# Patient Record
Sex: MALE | Race: WHITE | NOT HISPANIC OR LATINO | Employment: FULL TIME | URBAN - METROPOLITAN AREA
[De-identification: names, ages, dates, MRNs, and addresses within clinical notes are randomized per-mention and may not be internally consistent; named-entity substitution may affect disease eponyms.]

---

## 2019-08-05 ENCOUNTER — HOSPITAL ENCOUNTER (EMERGENCY)
Facility: CLINIC | Age: 24
Discharge: HOME | End: 2019-08-05
Attending: NURSE PRACTITIONER | Admitting: NURSE PRACTITIONER
Payer: OTHER MISCELLANEOUS

## 2019-08-05 VITALS
OXYGEN SATURATION: 100 % | BODY MASS INDEX: 21.82 KG/M2 | DIASTOLIC BLOOD PRESSURE: 83 MMHG | WEIGHT: 170 LBS | HEIGHT: 74 IN | SYSTOLIC BLOOD PRESSURE: 125 MMHG | TEMPERATURE: 97.7 F | RESPIRATION RATE: 16 BRPM

## 2019-08-05 DIAGNOSIS — S50.811A ABRASION OF RIGHT FOREARM, INITIAL ENCOUNTER: ICD-10-CM

## 2019-08-05 PROCEDURE — 99282 EMERGENCY DEPT VISIT SF MDM: CPT | Performed by: NURSE PRACTITIONER

## 2019-08-05 PROCEDURE — 99282 EMERGENCY DEPT VISIT SF MDM: CPT | Mod: Z6 | Performed by: NURSE PRACTITIONER

## 2019-08-05 RX ORDER — LIDOCAINE HYDROCHLORIDE 20 MG/ML
JELLY TOPICAL ONCE
Status: DISCONTINUED | OUTPATIENT
Start: 2019-08-05 | End: 2019-08-05 | Stop reason: HOSPADM

## 2019-08-05 RX ORDER — GINSENG 100 MG
CAPSULE ORAL 2 TIMES DAILY
Status: DISCONTINUED | OUTPATIENT
Start: 2019-08-05 | End: 2019-08-05 | Stop reason: HOSPADM

## 2019-08-05 ASSESSMENT — MIFFLIN-ST. JEOR: SCORE: 1825.86

## 2019-08-05 ASSESSMENT — ENCOUNTER SYMPTOMS: FEVER: 0

## 2019-08-05 NOTE — ED AVS SNAPSHOT
[facility]  [address] [facility] [address]  Phone:  [phone]  Fax:  [phone]                                    Simone Gallardo   MRN: [id number]    Department:  [facility]   Date of Visit:  8/5/2019           After Visit Summary Signature Page    I have received my discharge instructions, and my questions have been answered. I have discussed any challenges I see with this plan with the nurse or doctor.    ..........................................................................................................................................  Patient/Patient Representative Signature      ..........................................................................................................................................  Patient Representative Print Name and Relationship to Patient    ..................................................               ................................................  Date                                   Time    ..........................................................................................................................................  Reviewed by Signature/Title    ...................................................              ..............................................  Date                                               Time          22EPIC Rev 08/18

## 2019-08-05 NOTE — ED TRIAGE NOTES
to right lower forearm, states he was a work and a machine fell. aprox 4 inch lac, bleeding controlled with bandage

## 2019-08-05 NOTE — DISCHARGE INSTRUCTIONS
Bacitracin and cover with nonadherent dressing. Change minimally twice a day and as needed.  Keep wound clean, dry and covered until healed.  Ok to return to work tomorrow.  Return for any signs of infection (increased redness, swelling, pain)

## 2019-08-05 NOTE — ED PROVIDER NOTES
"  History     Chief Complaint   Patient presents with     Laceration     to right lower forearm, states he was a work and a machine fell.     HPI  Simone Gallardo is a 24 year old male who presents to the emergency department for evaluation of right forearm abrasion.  This occurred at work today.  Patient attemtped to catch a metal lathe that was falling and it hit into his right forearm.  Tetanus is up-to-date.    Allergies:  No Known Allergies    Problem List:    Patient Active Problem List    Diagnosis Date Noted     Puncture wound of hand, left, subsequent encounter 04/25/2016     Priority: Medium        Past Medical History:    No past medical history on file.    Past Surgical History:    No past surgical history on file.    Family History:    No family history on file.    Social History:  Marital Status:  Single [1]  Social History     Tobacco Use     Smoking status: Never Smoker     Smokeless tobacco: Current User   Substance Use Topics     Alcohol use: No     Drug use: No        Medications:      No current outpatient medications on file.      Review of Systems   Constitutional: Negative for fever.   Musculoskeletal:        Forearm pain and laceration.       Physical Exam   BP: 125/83  Heart Rate: 65  Temp: 97.7  F (36.5  C)  Resp: 16  Height: 188 cm (6' 2\")  Weight: 77.1 kg (170 lb)  SpO2: 100 %      Physical Exam    GENERAL APPEARANCE: healthy, alert and no distress  SKIN: several superficial abrasions to the left mid volar forearm with surrounding grease/metal debris.       ED Course        Procedures           Wound care:  --applied lidocaine gel to the forearm and left in place for 20 minutes.  --wound was cleansed by nursing technician with normal saline thoroughly.     Reassessed after wound cleansed and appears clean, no foreign body or further contamination.  Bacitracin and dressing applied by nursing.       No results found for this or any previous visit (from the past 24 hour(s)).    Medications - No " data to display    Assessments & Plan (with Medical Decision Making)     Right forearm abrasion (work injury):    Bacitracin and cover with nonadherent dressing. Change minimally twice a day and as needed.  Keep wound clean, dry and covered until healed.  Ok to return to work tomorrow.  Return for any signs of infection (increased redness, swelling, pain)  Workability form provided.     I have reviewed the nursing notes.    I have reviewed the findings, diagnosis, plan and need for follow up with the patient.         Medication List      There are no discharge medications for this visit.         Final diagnoses:   Abrasion of right forearm, initial encounter       8/5/2019   [facility] EMERGENCY DEPARTMENT     Mariia Jenkins APRN CNP  08/05/19 [id number]

## 2022-11-24 ENCOUNTER — HOSPITAL ENCOUNTER (EMERGENCY)
Facility: CLINIC | Age: 27
Discharge: HOME | End: 2022-11-24
Attending: EMERGENCY MEDICINE | Admitting: EMERGENCY MEDICINE
Payer: COMMERCIAL

## 2022-11-24 ENCOUNTER — NURSE TRIAGE (OUTPATIENT)
Dept: NURSING | Facility: CLINIC | Age: 27
End: 2022-11-24

## 2022-11-24 ENCOUNTER — APPOINTMENT (OUTPATIENT)
Dept: GENERAL RADIOLOGY | Facility: CLINIC | Age: 27
End: 2022-11-24
Attending: EMERGENCY MEDICINE
Payer: COMMERCIAL

## 2022-11-24 VITALS
HEART RATE: 70 BPM | HEIGHT: 73 IN | DIASTOLIC BLOOD PRESSURE: 80 MMHG | TEMPERATURE: 97.6 F | WEIGHT: 165 LBS | BODY MASS INDEX: 21.87 KG/M2 | RESPIRATION RATE: 18 BRPM | SYSTOLIC BLOOD PRESSURE: 145 MMHG | OXYGEN SATURATION: 97 %

## 2022-11-24 DIAGNOSIS — K21.9 GASTROESOPHAGEAL REFLUX DISEASE, UNSPECIFIED WHETHER ESOPHAGITIS PRESENT: ICD-10-CM

## 2022-11-24 DIAGNOSIS — R09.A2 SENSATION OF FOREIGN BODY IN THROAT: ICD-10-CM

## 2022-11-24 LAB
DEPRECATED S PYO AG THROAT QL EIA: NEGATIVE
FLUAV RNA SPEC QL NAA+PROBE: NEGATIVE
FLUBV RNA RESP QL NAA+PROBE: NEGATIVE
GROUP A STREP BY PCR: NOT DETECTED
SARS-COV-2 RNA RESP QL NAA+PROBE: NEGATIVE

## 2022-11-24 PROCEDURE — 87636 SARSCOV2 & INF A&B AMP PRB: CPT | Performed by: EMERGENCY MEDICINE

## 2022-11-24 PROCEDURE — 87651 STREP A DNA AMP PROBE: CPT | Performed by: EMERGENCY MEDICINE

## 2022-11-24 PROCEDURE — 70360 X-RAY EXAM OF NECK: CPT

## 2022-11-24 PROCEDURE — 99284 EMERGENCY DEPT VISIT MOD MDM: CPT | Mod: CS | Performed by: EMERGENCY MEDICINE

## 2022-11-24 PROCEDURE — C9803 HOPD COVID-19 SPEC COLLECT: HCPCS | Performed by: EMERGENCY MEDICINE

## 2022-11-24 PROCEDURE — 250N000009 HC RX 250: Performed by: EMERGENCY MEDICINE

## 2022-11-24 PROCEDURE — 250N000013 HC RX MED GY IP 250 OP 250 PS 637: Performed by: EMERGENCY MEDICINE

## 2022-11-24 RX ORDER — SUCRALFATE ORAL 1 G/10ML
1 SUSPENSION ORAL 4 TIMES DAILY
Qty: 400 ML | Refills: 0 | Status: SHIPPED | OUTPATIENT
Start: 2022-11-24 | End: 2022-12-22

## 2022-11-24 RX ORDER — SUCRALFATE ORAL 1 G/10ML
1 SUSPENSION ORAL
Status: DISCONTINUED | OUTPATIENT
Start: 2022-11-24 | End: 2022-11-24 | Stop reason: HOSPADM

## 2022-11-24 RX ADMIN — ALUMINUM HYDROXIDE, MAGNESIUM HYDROXIDE, AND SIMETHICONE 30 ML: 200; 200; 20 SUSPENSION ORAL at 17:34

## 2022-11-24 RX ADMIN — SUCRALFATE 1 G: 1 SUSPENSION ORAL at 17:33

## 2022-11-24 ASSESSMENT — ACTIVITIES OF DAILY LIVING (ADL): ADLS_ACUITY_SCORE: 33

## 2022-11-24 NOTE — ED PROVIDER NOTES
History   Chief complaint: Feels like something stuck in his throat.    HPI  Simone Gallardo is a 27 year old male with history of anxiety, GERD and smoking with globus sensation which began yesterday while smoking. He also has had ~ 4 days of worsening GERD symptoms.  He was not eating at time of symptom onset and he had no suspicion for retained food or other retained ingested substance such as medication.  No voice change, drooling or respiratory difficulty.  No chest pain, cough or shortness of breath. No F/C.   He has recently had increased GERD symptoms past 4 to 5 days.  He takes Omeprazole, but misses it sometimes.    Previous Records in Care Everywhere were Reviewed:  Allergies    No known active allergies    Medications  Reconcile with Patient's Chart  Medications  Medication Sig Dispensed Refills Start Date End Date Status   fexofenadine (ALLEGRA) 180 mg tablet     Take one tablet by mouth once daily 90 tablet   1 10/11/2019   Active     omeprazole (PRILOSEC) 40 mg Delayed-Release capsule     TAKE 1 CAPSULE BY MOUTH ONCE DAILY BEFORE A MEAL. 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   Mastocytosis 06/25/2019   Gilbert's syndrome 11/16/2017   Major depressive disorder, recurrent episode, moderate 11/03/2015   Generalized anxiety disorder 11/03/2015   Attention deficit hyperactivity disorder, combined type (provisional) 11/03/2015     Surgical History    Surgical History  Surgery Date Site/Laterality Comments   COLONOSCOPY 07/30/2019   repeat at age 49       Medical History    Medical History  Medical History Date Comments   Gilbert's syndrome 09/19/2019     Autoimmune disease (HC) 09/19/2019     Systemic involvement of connective tissue, unspecified (HC) 09/19/2019       Family History    Family History  Medical History Relation Name Comments   Good Health Father  "       Cancer-colon Maternal Grandfather        Cancer-prostate Maternal Grandfather        Cancer-pancreatic Maternal Grandmother        Rheum arthritis Maternal Grandmother        Hypertension Mother        Melanoma Mother        Thyroid Disease Mother        Cancer-prostate Paternal Grandfather        Heart Disease Paternal Grandfather        Diabetes Paternal Grandmother          Family History  Relation Name Status Comments   Father    Alive     Maternal Grandfather          Maternal Grandmother          Mother    Alive     Paternal Grandfather          Paternal Grandmother            Social History    Social History  Tobacco Use Types Packs/Day Years Used Date   Current Every Day Smoker Cigarettes 0.5   Quit: 11/17/2015   Smokeless Tobacco: Former User Chew         Social History  Tobacco Cessation: Ready to Quit: No; Counseling Given: Yes     Social History  Alcohol Use Standard Drinks/Week Comments   Yes 0 (1 standard drink = 0.6 oz pure alcohol) occaisionally       Allergies:  No Known Allergies    Problem List:    Patient Active Problem List    Diagnosis Date Noted     Puncture wound of hand, left, subsequent encounter 04/25/2016     Priority: Medium        Past Medical History:    History reviewed. No pertinent past medical history.    Past Surgical History:    History reviewed. No pertinent surgical history.    Family History:    History reviewed. No pertinent family history.    Social History:  Marital Status:  Single [1]  Social History     Tobacco Use     Smoking status: Never     Smokeless tobacco: Current   Substance Use Topics     Alcohol use: No     Drug use: No        Medications:    sucralfate (CARAFATE) 1 GM/10ML suspension      Review of Systems  As mentioned above in the history present illness.  All other systems were reviewed and are negative.    Physical Exam   BP: (!) 136/93  Pulse: 74  Temp: 97.6  F (36.4  C)  Resp: 16  Height: 185.4 cm (6' 1\")  Weight: 74.8 kg (165 lb)  SpO2: 99 " %      Physical Exam  Vitals and nursing note reviewed.   Constitutional:       General: He is not in acute distress.     Appearance: Normal appearance. He is well-developed. He is not ill-appearing or diaphoretic.   HENT:      Head: Normocephalic and atraumatic.      Right Ear: External ear normal.      Left Ear: External ear normal.      Nose: Nose normal.      Mouth/Throat:      Mouth: Mucous membranes are moist. No oral lesions or angioedema.      Dentition: No gum lesions.      Tongue: No lesions. Tongue does not deviate from midline.      Palate: No mass and lesions.      Pharynx: Oropharynx is clear. Uvula midline. No pharyngeal swelling, oropharyngeal exudate, posterior oropharyngeal erythema or uvula swelling.   Eyes:      General: No scleral icterus.     Extraocular Movements: Extraocular movements intact.      Conjunctiva/sclera: Conjunctivae normal.   Neck:      Thyroid: No thyroid mass or thyromegaly.      Vascular: Normal carotid pulses.      Trachea: Trachea and phonation normal. No tracheal deviation.   Cardiovascular:      Rate and Rhythm: Normal rate and regular rhythm.      Heart sounds: Normal heart sounds. No murmur heard.    No friction rub. No gallop.   Pulmonary:      Effort: Pulmonary effort is normal. No respiratory distress.      Breath sounds: Normal breath sounds. No stridor. No wheezing, rhonchi or rales.   Musculoskeletal:         General: No swelling. Normal range of motion.      Cervical back: Full passive range of motion without pain, normal range of motion and neck supple. Crepitus present. No muscular tenderness. Normal range of motion.      Right lower leg: No edema.      Left lower leg: No edema.   Lymphadenopathy:      Cervical: No cervical adenopathy.   Skin:     General: Skin is warm and dry.      Coloration: Skin is not pale.      Findings: No erythema or rash.   Neurological:      General: No focal deficit present.      Mental Status: He is alert and oriented to person,  place, and time.   Psychiatric:         Mood and Affect: Mood normal.         Behavior: Behavior normal.         ED Course             Procedures            Results for orders placed or performed during the hospital encounter of 11/24/22   Neck soft tissue XR     Status: None    Narrative    EXAM: XR NECK SOFT TISSUE  LOCATION: [facility]  DATE/TIME: 11/24/2022 5:34 PM    INDICATION: Globus sensation  COMPARISON: None.  TECHNIQUE: CR Neck Soft Tissue.      Impression    IMPRESSION: No prevertebral edema. Normal appearance of the epiglottis and larynx. No airway compromise. No radiopaque foreign body.   Symptomatic; Yes; 11/23/2022 Influenza A/B & SARS-CoV2 (COVID-19) Virus PCR Multiplex Nose     Status: Normal    Specimen: Nose; Swab   Result Value Ref Range    Influenza A PCR Negative Negative    Influenza B PCR Negative Negative    SARS CoV2 PCR Negative Negative    Narrative    Testing was performed using the tracy SARS-CoV-2 & Influenza A/B Assay on the tracy Jeana System. This test should be ordered for the detection of SARS-CoV-2 and influenza viruses in individuals who meet clinical and/or epidemiological criteria. Test performance is unknown in asymptomatic patients. This test is for in vitro diagnostic use under the FDA EUA for laboratories certified under CLIA to perform moderate and/or high complexity testing. This test has not been FDA cleared or approved. A negative result does not rule out the presence of PCR inhibitors in the specimen or target RNA in concentration below the limit of detection for the assay. If only one viral target is positive but coinfection with multiple targets is suspected, the sample should be re-tested with another FDA cleared, approved or authorized test, if coinfection would change clinical management. [facility] are certified under the Clinical Laboratory Improvement Amendments of 1988 (CLIA-88) as qualified to perform moderate  and/or high complexity laboratory testing.   Streptococcus A Rapid Scr w Reflx to PCR     Status: Normal    Specimen: Throat; Swab   Result Value Ref Range    Group A Strep antigen Negative Negative   Group A Streptococcus PCR Throat Swab     Status: Normal    Specimen: Throat; Swab   Result Value Ref Range    Group A strep by PCR Not Detected Not Detected    Narrative    The Xpert Xpress Strep A test, performed on the [employer] Systems, is a rapid, qualitative in vitro diagnostic test for the detection of Streptococcus pyogenes (Group A ß-hemolytic Streptococcus, Strep A) in throat swab specimens from patients with signs and symptoms of pharyngitis. The Xpert Xpress Strep A test can be used as an aid in the diagnosis of Group A Streptococcal pharyngitis. The assay is not intended to monitor treatment for Group A Streptococcus infections. The Xpert Xpress Strep A test utilizes an automated real-time polymerase chain reaction (PCR) to detect Streptococcus pyogenes DNA.     I independently reviewed the X-rays: Agree with the Radiologist's interpretation.      Medications   lidocaine (viscous) (XYLOCAINE) 2 % 15 mL, alum & mag hydroxide-simethicone (MAALOX) 15 mL GI Cocktail (30 mLs Oral Given 11/24/22 1734)     He had symptom relief with a GI cocktail of antacid and viscous lidocaine.    Assessments & Plan (with Medical Decision Making)   27 year old male with history of GERD and smoking with globus sensation which began yesterday while smoking, with ~ 4 days of worsening GERD symptoms.  Unremarkable benign examination and soft tissue neck films are unremarkable.  No indication for emergent direct laryngoscopy or CT imaging evaluation. I suspect he has symptoms secondary to inflammation from GERD, with relief of symptoms with a GI cocktail of antacid and viscous lidocaine. I will have him increase Omeprazole, using an NSAID, Rx sucralfate and refer him to ENT. He urged to stop smoking was provided  instructions for supportive care and will return as needed for worsened condition or worsening symptoms, or new problems or concerns.    I have reviewed the nursing notes.    I have reviewed the findings, diagnosis, plan and need for follow up with the patient.      New Prescriptions    Sucralfate 1 g po 4 times daily X 10 days. Refills: 0.        Final diagnoses:   Sensation of foreign body in throat   Gastroesophageal reflux disease, unspecified whether esophagitis present       11/24/2022   [facility] EMERGENCY DEPT     Tom Carmona MD  11/29/22 5133

## 2022-11-24 NOTE — DISCHARGE INSTRUCTIONS
May increase Prilosec/omeprazole from 20 mg daily to 30 mg daily to up to 40 mg daily as needed.  This decreases stomach acid production.    Use an antacid to neutralize stomach acid.    Use Sucralfate/Carafate to promote healing of inflammation.    Follow-up in ENT clinic in primary care clinic.

## 2022-11-24 NOTE — TELEPHONE ENCOUNTER
"Nurse Triage SBAR    Is this a 2nd Level Triage? NO    Situation: Mom calling about patient having a sore throat and lump under his rosales apple. Patient is present as well and able to answer triage questions.  Consent: obtained verbally from patient    Background: Sore throat, vomiting, lump in throat, hard to swallow for the last 2 days    Assessment:   No fever  Poor appetite  Lump under rosales apple causing him to gag and vomit  No breathing difficulty  Able to swallow    Protocol Recommended Disposition:   See PCP within 3 days    Recommendation: Advised patient to schedule an appointment in clinic within 3 days. Patient is new to  and there are no clinic appointments available within that time frame. Advised going to urgent care clinic. Care advice given. Patient verbalized understanding and agreed with plan.     Sandra Galan RN [facility] 11/24/2022 11:15 AM    Reason for Disposition    [1] Sore throat is the only symptom AND [2] present > 48 hours    Additional Information    Negative: SEVERE difficulty breathing (e.g., struggling for each breath, speaks in single words, stridor)    Negative: Sounds like a life-threatening emergency to the triager    Negative: [1] Drooling or spitting out saliva (because can't swallow) AND [2] normal breathing    Negative: Unable to open mouth completely    Negative: [1] Difficulty breathing AND [2] not severe    Negative: Fever > 104 F (40 C)    Negative: [1] Refuses to drink anything AND [2] for > 12 hours    Negative: [1] Drinking very little AND [2] dehydration suspected (e.g., no urine > 12 hours, very dry mouth, very lightheaded)    Negative: Patient sounds very sick or weak to the triager    Negative: SEVERE (e.g., excruciating) throat pain    Negative: [1] Pus on tonsils (back of throat) AND [2]  fever AND [3] swollen neck lymph nodes (\"glands\")    Negative: [1] Rash AND [2] widespread (especially chest and abdomen)    Negative: Earache also " present    Negative: Fever present > 3 days (72 hours)    Negative: Diabetes mellitus or weak immune system (e.g., HIV positive, cancer chemo, splenectomy, organ transplant, chronic steroids)    Negative: History of rheumatic fever    Negative: [1] Adult is leaving on a trip AND [2] requests an antibiotic NOW    Negative: [1] Positive throat culture or rapid strep test (according to lab, PCP, caller, etc.) AND [2] NO  standing order to call in prescription for antibiotic    Negative: [1] Exposure to family member (or spouse or boyfriend/girlfriend) with test-proven strep AND [2] within last 10 days    Negative: [1] Diagnosed strep throat AND [2] taking antibiotic AND [3] symptoms continue    Negative: Throat culture results, call about    Negative: Productive cough is main symptom    Negative: Non-productive cough is main symptom    Negative: Hoarseness is main symptom    Negative: Runny nose is main symptom    Negative: Uvula swelling is main symptom    Protocols used: SORE THROAT-A-

## 2022-11-24 NOTE — ED TRIAGE NOTES
Happened yesterday. Pt is a smoker. States it feels like something is in his throat. Doesn't think he swallowed anything. cough

## 2022-11-30 ENCOUNTER — HOSPITAL ENCOUNTER (EMERGENCY)
Facility: CLINIC | Age: 27
Discharge: HOME | End: 2022-11-30
Attending: PHYSICIAN ASSISTANT | Admitting: PHYSICIAN ASSISTANT
Payer: COMMERCIAL

## 2022-11-30 VITALS
OXYGEN SATURATION: 99 % | DIASTOLIC BLOOD PRESSURE: 87 MMHG | RESPIRATION RATE: 20 BRPM | HEART RATE: 56 BPM | SYSTOLIC BLOOD PRESSURE: 149 MMHG | TEMPERATURE: 97.7 F

## 2022-11-30 DIAGNOSIS — R07.0 THROAT PAIN: ICD-10-CM

## 2022-11-30 PROCEDURE — G0463 HOSPITAL OUTPT CLINIC VISIT: HCPCS | Performed by: PHYSICIAN ASSISTANT

## 2022-11-30 PROCEDURE — 99213 OFFICE O/P EST LOW 20 MIN: CPT | Performed by: PHYSICIAN ASSISTANT

## 2022-11-30 RX ORDER — PENICILLIN V POTASSIUM 500 MG/1
500 TABLET, FILM COATED ORAL 2 TIMES DAILY
Qty: 20 TABLET | Refills: 0 | Status: SHIPPED | OUTPATIENT
Start: 2022-11-30 | End: 2022-12-10

## 2022-11-30 RX ORDER — AMITRIPTYLINE HYDROCHLORIDE 10 MG/1
TABLET ORAL
COMMUNITY
End: 2022-12-15

## 2022-11-30 RX ORDER — FEXOFENADINE HCL 180 MG/1
TABLET ORAL EVERY 24 HOURS
COMMUNITY

## 2022-11-30 RX ORDER — EPINEPHRINE 0.3 MG/.3ML
INJECTION SUBCUTANEOUS
COMMUNITY

## 2022-11-30 RX ORDER — OMEPRAZOLE 40 MG/1
CAPSULE, DELAYED RELEASE ORAL
COMMUNITY
End: 2022-12-15 | Stop reason: DRUGHIGH

## 2022-11-30 RX ORDER — CEPHALEXIN 500 MG/1
CAPSULE ORAL
COMMUNITY
End: 2022-12-15

## 2022-11-30 NOTE — ED PROVIDER NOTES
History     Chief Complaint   Patient presents with     Pharyngitis     Pt has been having a sore throat for a few weeks it feels like something is stuck in his throat.      HPI  Simone Gallardo is a 27 year old male who presents to urgent care with concern over sore throat and present for approximate last 10 days.  Patient complains of sensation of uvular swelling, foreign body and states that he has had episodes of vomiting.  He also also complained of sweats.  He denies any objective fever, chills, myalgias, nasal congestion, abdominal pain, diarrhea, melena, hematochezia.  He does report a history of acid reflux takes 40 of omeprazole daily and states that he feels like his symptoms are well controlled.  He was evaluated in the emergency department last week for symptoms and had negative rapid strep test, negative influenza testing.  He was discharged home stable with prescription for Carafate for symptomatic relief which she states he has been taking 4 times daily with minimal temporary improvement. He states that symptoms became worse after he smoked hemp last night. He has follow up appointment with ENT scheduled later this week.      Allergies:  No Known Allergies    Problem List:    Patient Active Problem List    Diagnosis Date Noted     Puncture wound of hand, left, subsequent encounter 04/25/2016     Priority: Medium      Past Medical History:    History reviewed. No pertinent past medical history.    Past Surgical History:    History reviewed. No pertinent surgical history.    Family History:    History reviewed. No pertinent family history.    Social History:  Marital Status:  Single [1]  Social History     Tobacco Use     Smoking status: Former     Types: Cigarettes     Smokeless tobacco: Current   Substance Use Topics     Alcohol use: No     Drug use: No      Medications:    penicillin V (VEETID) 500 MG tablet  amitriptyline (ELAVIL) 10 MG tablet  cephALEXin (KEFLEX) 500 MG capsule  EPINEPHrine (ANY  BX GENERIC EQUIV) 0.3 MG/0.3ML injection 2-pack  fexofenadine (ALLEGRA ALLERGY) 180 MG tablet  omeprazole (PRILOSEC) 40 MG DR capsule  ranitidine (ZANTAC) 300 MG tablet  sucralfate (CARAFATE) 1 GM/10ML suspension      Review of Systems  CONSTITUTIONAL:POSITIVE  for sweats and NEGATIVE  for objective fever, chills, myalgias   INTEGUMENTARY/SKIN: NEGATIVE for worrisome rashes, moles or lesions  EYES: NEGATIVE for vision changes or irritation  ENT/MOUTH: POSITIVE for sore throat, and NEGATIVE for nasal congestion, ear pain   RESP: NEGATIVE cough for SOB/dyspnea and wheezing  GI: POSITIVE for vomiting NEGATIVE for diarrhea, abdominal pain  Physical Exam   BP: (!) 149/87  Pulse: 56  Temp: 97.7  F (36.5  C)  Resp: 20  SpO2: 99 %  Physical Exam  GENERAL APPEARANCE: healthy, alert and no distress  EYES: EOMI,  PERRL, conjunctiva clear  HENT: ear canals and TM's normal.  Oral mucosa moist.  Posterior pharynx is nonerythematous.  No tonsillar or uvular swelling  NECK: supple, nontender, no lymphadenopathy  RESP: lungs clear to auscultation - no rales, rhonchi or wheezes  CV: regular rates and rhythm, normal S1 S2, no murmur noted  ABDOMEN:  soft, nontender, no HSM or masses and bowel sounds normal  SKIN: no suspicious lesions or rashes   ED Course           Procedures       Critical Care time:  none        No results found for this or any previous visit (from the past 24 hour(s)).  Medications - No data to display    Assessments & Plan (with Medical Decision Making)     I have reviewed the nursing notes.  I have reviewed the findings, diagnosis, plan and need for follow up with the patient.     Discharge Medication List as of 11/30/2022 12:46 PM      START taking these medications    Details   penicillin V (VEETID) 500 MG tablet Take 1 tablet (500 mg) by mouth 2 times daily for 10 days, Disp-20 tablet, R-0, E-Prescribe           Final diagnoses:   Throat pain     27-year-old male presents to urgent care with persistent 10-day  history of throat pain was previously evaluated in the emergency department and diagnosed with acid reflux.  He had elevated blood pressure upon arrival, remainder vital signs stable.  Physical exam findings did not demonstrate any obvious abnormality in the posterior pharynx.  I discussed with patient that I would favor acid reflux as source of his symptoms.  Differential would also include postnasal drainage, irritation from smoking.  I have a low suspicion for bacterial infection is strong given negative rapid strep however I did ultimately agree to provide antibiotic per patient's request.  No signs of peritonsillar cellulitis or peritonsillar or retropharyngeal abscess. He was discharged home stable with prescription for penicillin.  Instructions to follow-up with ENT as previously scheduled if symptoms persist.  Worrisome reasons to return to the ER/UC sooner discussed.     Disclaimer: This note consists of symbols derived from keyboarding, dictation, and/or voice recognition software. As a result, there may be errors in the script that have gone undetected.  Please consider this when interpreting information found in the chart.    11/30/2022   [facility] EMERGENCY DEPT     Naye Rivas PA-C  12/03/22 1059

## 2022-12-15 ENCOUNTER — OFFICE VISIT (OUTPATIENT)
Dept: FAMILY MEDICINE | Facility: CLINIC | Age: 27
End: 2022-12-15
Payer: COMMERCIAL

## 2022-12-15 VITALS
HEIGHT: 74 IN | DIASTOLIC BLOOD PRESSURE: 70 MMHG | OXYGEN SATURATION: 99 % | SYSTOLIC BLOOD PRESSURE: 112 MMHG | WEIGHT: 162.8 LBS | BODY MASS INDEX: 20.89 KG/M2 | RESPIRATION RATE: 18 BRPM | HEART RATE: 71 BPM | TEMPERATURE: 97.5 F

## 2022-12-15 DIAGNOSIS — K21.00 GASTROESOPHAGEAL REFLUX DISEASE WITH ESOPHAGITIS WITHOUT HEMORRHAGE: Primary | ICD-10-CM

## 2022-12-15 LAB
ALBUMIN SERPL BCG-MCNC: 5.1 G/DL (ref 3.5–5.2)
ALP SERPL-CCNC: 56 U/L (ref 40–129)
ALT SERPL W P-5'-P-CCNC: 14 U/L (ref 10–50)
ANION GAP SERPL CALCULATED.3IONS-SCNC: 10 MMOL/L (ref 7–15)
AST SERPL W P-5'-P-CCNC: 15 U/L (ref 10–50)
BILIRUB SERPL-MCNC: 2 MG/DL
BUN SERPL-MCNC: 14 MG/DL (ref 6–20)
CALCIUM SERPL-MCNC: 10 MG/DL (ref 8.6–10)
CHLORIDE SERPL-SCNC: 106 MMOL/L (ref 98–107)
CREAT SERPL-MCNC: 1.16 MG/DL (ref 0.67–1.17)
DEPRECATED HCO3 PLAS-SCNC: 27 MMOL/L (ref 22–29)
ERYTHROCYTE [DISTWIDTH] IN BLOOD BY AUTOMATED COUNT: 12.1 % (ref 10–15)
GFR SERPL CREATININE-BSD FRML MDRD: 88 ML/MIN/1.73M2
GLUCOSE SERPL-MCNC: 91 MG/DL (ref 70–99)
HCT VFR BLD AUTO: 48.4 % (ref 40–53)
HGB BLD-MCNC: 16.7 G/DL (ref 13.3–17.7)
MCH RBC QN AUTO: 30.9 PG (ref 26.5–33)
MCHC RBC AUTO-ENTMCNC: 34.5 G/DL (ref 31.5–36.5)
MCV RBC AUTO: 90 FL (ref 78–100)
PLATELET # BLD AUTO: 168 10E3/UL (ref 150–450)
POTASSIUM SERPL-SCNC: 5.3 MMOL/L (ref 3.4–5.3)
PROT SERPL-MCNC: 7.1 G/DL (ref 6.4–8.3)
RBC # BLD AUTO: 5.4 10E6/UL (ref 4.4–5.9)
SODIUM SERPL-SCNC: 143 MMOL/L (ref 136–145)
TSH SERPL DL<=0.005 MIU/L-ACNC: 0.99 UIU/ML (ref 0.3–4.2)
WBC # BLD AUTO: 5.5 10E3/UL (ref 4–11)

## 2022-12-15 PROCEDURE — 80053 COMPREHEN METABOLIC PANEL: CPT | Performed by: PHYSICIAN ASSISTANT

## 2022-12-15 PROCEDURE — 99214 OFFICE O/P EST MOD 30 MIN: CPT | Performed by: PHYSICIAN ASSISTANT

## 2022-12-15 PROCEDURE — 85027 COMPLETE CBC AUTOMATED: CPT | Performed by: PHYSICIAN ASSISTANT

## 2022-12-15 PROCEDURE — 36415 COLL VENOUS BLD VENIPUNCTURE: CPT | Performed by: PHYSICIAN ASSISTANT

## 2022-12-15 PROCEDURE — 84443 ASSAY THYROID STIM HORMONE: CPT | Performed by: PHYSICIAN ASSISTANT

## 2022-12-15 RX ORDER — PANTOPRAZOLE SODIUM 20 MG/1
20-40 TABLET, DELAYED RELEASE ORAL DAILY
Qty: 180 TABLET | Refills: 1 | Status: SHIPPED | OUTPATIENT
Start: 2022-12-15 | End: 2023-06-23

## 2022-12-15 ASSESSMENT — PAIN SCALES - GENERAL: PAINLEVEL: NO PAIN (0)

## 2022-12-15 ASSESSMENT — ENCOUNTER SYMPTOMS
ARTHRALGIAS: 0
HEMATURIA: 0
FREQUENCY: 0
WEAKNESS: 0
NERVOUS/ANXIOUS: 0
CHILLS: 0
SHORTNESS OF BREATH: 0
MYALGIAS: 0
PARESTHESIAS: 0
DIARRHEA: 0
COUGH: 0
FEVER: 0
HEARTBURN: 0
HEADACHES: 0
NAUSEA: 0
HEMATOCHEZIA: 0
PALPITATIONS: 0
DYSURIA: 0
SORE THROAT: 0
ABDOMINAL PAIN: 0
EYE PAIN: 0
DIZZINESS: 0
CONSTIPATION: 0
JOINT SWELLING: 0

## 2022-12-15 NOTE — PATIENT INSTRUCTIONS
Switch Prilosec to Protonix. Start with 1 tablet daily and increase to 2 if needed. Continue Pepcid. If you need you can also take Tums or other OTC medicines for heart burn.     Schedule the Upper GI series at your convenience. Our team will call to schedule an EGD.     Follow-up with me in clinic if symptoms not controlled.     Preventive Health Recommendations  Male Ages 26 - 39    Yearly exam:             See your health care provider every year in order to  o   Review health changes.   o   Discuss preventive care.    o   Review your medicines if your doctor has prescribed any.  You should be tested each year for STDs (sexually transmitted diseases), if you re at risk.   After age 35, talk to your provider about cholesterol testing. If you are at risk for heart disease, have your cholesterol tested at least every 5 years.   If you are at risk for diabetes, you should have a diabetes test (fasting glucose).  Shots: Get a flu shot each year. Get a tetanus shot every 10 years.     Nutrition:  Eat at least 5 servings of fruits and vegetables daily.   Eat whole-grain bread, whole-wheat pasta and brown rice instead of white grains and rice.   Get adequate Calcium and Vitamin D.     Lifestyle  Exercise for at least 150 minutes a week (30 minutes a day, 5 days a week). This will help you control your weight and prevent disease.   Limit alcohol to one drink per day.   No smoking.   Wear sunscreen to prevent skin cancer.   See your dentist every six months for an exam and cleaning.

## 2022-12-15 NOTE — PROGRESS NOTES
Assessment & Plan   Gastroesophageal reflux disease with esophagitis without hemorrhage  Patient is new to me.  He is establishing care for his significant heartburn that is worsened over the last 1 to 2 months.  No trigger for this. However his symptoms are controlled on the max dose of Prilosec and Pepcid.  He has taken Prilosec for quite some time and would like to switch to a different medicine so we will try Protonix. We will continue work up for significant GERD with an Upper GI series to eval for a hiatal hernia, and an EGD to eval for ulcers, H pylori, severity of disease. Basic labs today as well with a TSH, CBC and CMP. Follow-up after testing has been completed.   - XR Esophagram w Upper GI; Future  - Adult GI  Referral - Procedure Only; Future  - pantoprazole (PROTONIX) 20 MG EC tablet; Take 1-2 tablets (20-40 mg) by mouth daily  - TSH with free T4 reflex; Future  - CBC with platelets; Future  - Comprehensive metabolic panel; Future    Return in about 4 weeks (around 1/12/2023), or if symptoms worsen or fail to improve, for In-Clinic Visit.    [facility]    Jv Nichols is a 27 year old accompanied by his mother, presenting for the following health issues:  Gastrophageal Reflux    HPI   GERD/Heartburn  Onset/Duration: 5-6 weeks   Description: Patient states that his throat has been burning. Also was seen in the Emergency room on Thanksgiving. Was referred to ENT but can not get in til February and they would like him seen sooner but was told that he needs a primary provider   Intensity: moderate  Progression of Symptoms: same  Accompanying Signs & Symptoms:  Does it feel like food gets stuck or trouble swallowing: Sometimes   Nausea: No  Vomiting (bloody?): No  Abdominal Pain: No  Black-Tarry stools: No  Bloody stools: No  History:  Previous similar episodes: No  Previous ulcers: No  Precipitating factors:   Caffeine use: No-not for the  "last month   Alcohol use: No  NSAID/Aspirin use: YES- Ibuprofen 2 days ago   Tobacco use: No-not for the last month   Worse with no particular food or drink.  Alleviating factors: None  Therapies tried and outcome:             Lifestyle changes: Quit tobacco, and caffeine changed his diet,              Medications: Omeprazole (Prilosec)-OTC, pepcid     Review of Systems   See HPI      Objective    /70   Pulse 71   Temp 97.5  F (36.4  C) (Tympanic)   Resp 18   Ht 1.88 m (6' 2\")   Wt 73.8 kg (162 lb 12.8 oz)   SpO2 99%   BMI 20.90 kg/m    Body mass index is 20.9 kg/m .  Physical Exam   Constitutional: healthy, alert, and no distress  Head: Normocephalic. Atraumatic  Eyes: No conjunctival injection, sclera anicteric  Neck: supple, no thyromegaly, nodules or asymmetry of the thyroid. No cervical LAD.  Cardiovascular: RRR. No murmurs, clicks, gallops, or rubs. No peripheral edema.   Respiratory: No resp distress. Lungs CTAB bilaterally.   Abdomen: soft, nontender, no rebound or guarding, no HSM or masses.   Musculoskeletal: extremities normal- no gross deformities noted, and normal muscle tone  Skin: no suspicious lesions or rashes  Neurologic: Gait normal. CN 2-12 grossly intact  Psychiatric: mentation appears normal and affect normal/bright               "

## 2022-12-16 ENCOUNTER — TELEPHONE (OUTPATIENT)
Dept: FAMILY MEDICINE | Facility: CLINIC | Age: 27
End: 2022-12-16

## 2022-12-16 NOTE — TELEPHONE ENCOUNTER
Prior Authorization Retail Medication Request    Medication/Dose: pantoprazole 20mg qty 180  ICD code (if different than what is on RX):     Previously Tried and Failed:     Rationale:  Gastroesophageal reflux disease with esophagitis without hemorrhage  Patient is new to me.  He is establishing care for his significant heartburn that is worsened over the last 1 to 2 months.  No trigger for this.  However his symptoms are controlled on the max dose of Prilosec and Pepcid.  He has taken Prilosec for quite some time  - XR Esophagram w Upper GI; Future  - Adult GI  Referral - Procedure Only; Future  - pantoprazole (PROTONIX) 20 MG EC tablet; Take 1-2 tablets (20-40 mg) by mouth daily    Insurance Name:  Covermymeds [id number]  Insurance ID:         Pharmacy Information (if different than what is on RX)  Name:     Phone:

## 2022-12-19 ENCOUNTER — NURSE TRIAGE (OUTPATIENT)
Dept: NURSING | Facility: CLINIC | Age: 27
End: 2022-12-19

## 2022-12-19 ENCOUNTER — TELEPHONE (OUTPATIENT)
Dept: FAMILY MEDICINE | Facility: CLINIC | Age: 27
End: 2022-12-19

## 2022-12-19 DIAGNOSIS — K21.9 GASTROESOPHAGEAL REFLUX DISEASE, UNSPECIFIED WHETHER ESOPHAGITIS PRESENT: Primary | ICD-10-CM

## 2022-12-19 DIAGNOSIS — K21.00 GASTROESOPHAGEAL REFLUX DISEASE WITH ESOPHAGITIS WITHOUT HEMORRHAGE: Primary | ICD-10-CM

## 2022-12-19 RX ORDER — MAGNESIUM HYDROXIDE/ALUMINUM HYDROXICE/SIMETHICONE 120; 1200; 1200 MG/30ML; MG/30ML; MG/30ML
15 SUSPENSION ORAL EVERY 8 HOURS PRN
Qty: 710 ML | Refills: 1 | Status: SHIPPED | OUTPATIENT
Start: 2022-12-19

## 2022-12-19 RX ORDER — LIDOCAINE HYDROCHLORIDE 20 MG/ML
15 SOLUTION OROPHARYNGEAL EVERY 8 HOURS PRN
Qty: 450 ML | Refills: 1 | Status: SHIPPED | OUTPATIENT
Start: 2022-12-19

## 2022-12-19 NOTE — TELEPHONE ENCOUNTER
Avinash Dimas is there something like magic mouthwash you can Rx or would you Rx an actual GI cocktail?

## 2022-12-19 NOTE — TELEPHONE ENCOUNTER
Mom returning call regarding Medication.  Tele Enc was sent to Jared Gandara PA-C  And Jared Gandraa PA-C is not in until Wed.  Please Advise sooner.  Please call pt or mother regarding what will be done with the medication.   Janeen [place] Sec

## 2022-12-19 NOTE — TELEPHONE ENCOUNTER
Patient will mix 15 mL of Viscous Lidocaine and 15 Ml of Maalox to create GI cocktail at home. Prescriptions for both were sent to his pharmacy.     Jared Gandara PA-C

## 2022-12-19 NOTE — TELEPHONE ENCOUNTER
Central Prior Authorization Team   Phone: [phone]    PA Initiation    Medication: Pantoprazole qty  Insurance Company:    Pharmacy Filling the Rx: [id number] [address]  Filling Pharmacy Phone: [phone]  Filling Pharmacy Fax:    Start Date: 12/19/2022

## 2022-12-19 NOTE — TELEPHONE ENCOUNTER
Reason for Call:  Other prescription    Detailed comments: Pt continues to have Sore Throat due to Gastro Reflux. Pt was placed on  Protonix.  Mom is asking if Jared Gandara PA-C would order the GI Cocktail   That he had received in the ER to numb the throat area. Pt / Mom did discuss this with Jared Gandara PA-C      Phone Number Patient can be reached at: Home number   ananya  on file Ananya [phone]  Pt [phone]     Best Time: Any Time      Can we leave a detailed message on this number? YES    Call taken on 12/19/2022 at 9:54 AM by Janeen Alva

## 2022-12-20 NOTE — TELEPHONE ENCOUNTER
Nurse Triage SBAR    Situation: Sore throat    Background: Mother, Ananya, calling. Consent: obtained verbally from patient. Since Thanksgiving - throat concerns - treated for acid reflux.     Assessment: Swallowed a cough drop 4 days ago and felt is scratch his throat. Hard to swallow/eat. No nasal congestion or runny nose. Stinging/burning sensation. Sore throat by the rosales apple. Still drinking. Eating still but less due to the pain. 3-4/10 when not eating and gets to a 6/10 with any swallowing. Upper abdominal pain for the last 3-4 days - not today. Throat pain is worsening. Temp 97.7 tympanic.     Recommendation: According to the protocol, Patient should wait for a call-back after nurse speaks with the on-call Provider. Advised Patient to wait for a call-back after nurse speaks with the on-call Provider. Care advice given. Patient verbalizes understanding and agrees with plan of care.     Paging on call Dr Melissa Nunez at 7:13pm. Per MD - Soft and mild foods. Salt water gargles. He could take ibuprofen 800 mg 3 times a day for 3 days with food and could try with Tylenol 1000mg 3 times a day. Chloraseptic spray.  No improvement in 3 days follow up with PCP. If worse or difficulty breathing then ED.     Provider Recommendation Follow Up:   Reached patient/caregiver. Informed of provider's recommendations. Patient verbalized understanding and agrees with the plan.       Chari Camargo RN Nursing Advisor 12/19/2022 7:26 PM     Reason for Disposition    Patient sounds very sick or weak to the triager    Additional Information    Negative: SEVERE difficulty breathing (e.g., struggling for each breath, speaks in single words, stridor)    Negative: Sounds like a life-threatening emergency to the triager    Negative: [1] Diagnosed strep throat AND [2] taking antibiotic AND [3] symptoms continue    Negative: Throat culture results, call about    Negative: Productive cough is main symptom    Negative: Non-productive cough  is main symptom    Negative: Hoarseness is main symptom    Negative: Runny nose is main symptom    Negative: Uvula swelling is main symptom    Negative: [1] Drooling or spitting out saliva (because can't swallow) AND [2] normal breathing    Negative: Unable to open mouth completely    Negative: [1] Difficulty breathing AND [2] not severe    Negative: Fever > 104 F (40 C)    Negative: [1] Refuses to drink anything AND [2] for > 12 hours    Negative: [1] Drinking very little AND [2] dehydration suspected (e.g., no urine > 12 hours, very dry mouth, very lightheaded)    Negative: Patient sounds very sick or weak to the triager    Negative: Any difficulty breathing (e.g., coughing, wheezing or stridor)    Negative: Sounds like a life-threatening emergency to the triager    Negative: Choked on or inhaled food or foreign body (but did not swallow it)    Negative: Symptoms of blocked esophagus (e.g., can't swallow normal secretions, drooling)    Negative: Can't swallow water or bread    Negative: [1] Vomited 2 or more times AND [2] FB hasn't passed    Negative: [1] Abdominal pain AND [2] FB hasn't passed    Negative: Blood in the stools    Negative: SEVERE symptoms of pill stuck in throat or esophagus (e.g., severe pain, bleeding, or inability to swallow liquids)    Negative: Sharp object (e.g., needle, nail, safety pin, toothpick, bone, bottle cap, pull tab, dental bridge work)  (Exception: tiny chips of glass generally pass without any symptoms)    Negative: Button battery (or other battery) known or suspected    Negative: Magnet    Negative: Packet of drugs (e.g., condom filled with cocaine)    Negative: Swallowed 2 or more FBs (e.g., multiple objects)    Negative: Symptoms of FB stuck in throat or esophagus (e.g., continued pain in throat or chest, FB sensation, blood-tinged saliva) (Exception: pill stuck)    Negative: Swallowed a poisonous object (e.g., a lead sinker or a bullet)    Negative: Swallowed a (non-food) FB  on purpose    Protocols used: SWALLOWED FOREIGN BODY-A-AH, SORE THROAT-A-AH

## 2022-12-21 NOTE — TELEPHONE ENCOUNTER
Prior Authorization Not Needed per Insurance    Medication: Pantoprazole qty  Insurance Company:    Expected CoPay:      Pharmacy Filling the Rx: [id number] - [place], [place] - [address]  Pharmacy Notified: Yes  Patient Notified: Yes  **Instructed pharmacy to notify patient when script is ready to /ship.**

## 2022-12-22 ENCOUNTER — TELEPHONE (OUTPATIENT)
Dept: FAMILY MEDICINE | Facility: CLINIC | Age: 27
End: 2022-12-22

## 2022-12-22 DIAGNOSIS — K21.00 GASTROESOPHAGEAL REFLUX DISEASE WITH ESOPHAGITIS WITHOUT HEMORRHAGE: Primary | ICD-10-CM

## 2022-12-22 RX ORDER — SUCRALFATE ORAL 1 G/10ML
SUSPENSION ORAL
Qty: 400 ML | Refills: 0 | Status: SHIPPED | OUTPATIENT
Start: 2022-12-22

## 2022-12-22 NOTE — TELEPHONE ENCOUNTER
Routing to current provider for consideration, not active on med list recent visit.    Dulce Morris RN MSN

## 2022-12-22 NOTE — TELEPHONE ENCOUNTER
Patient called with questions regarding referral  Asked what EGD will do in regards to treatments.   Discussed scope and biopsies. Surgeons will not manage long term GI medications.   Patient's mom requested GI referral to [facility]- would like to discuss with them and do EGD there.   Referral pended. Please sign if appropriate    Thank you,   Pankaj KIRKPATRICK RN  [facility]

## 2022-12-23 ENCOUNTER — HOSPITAL ENCOUNTER (OUTPATIENT)
Dept: GENERAL RADIOLOGY | Facility: CLINIC | Age: 27
Discharge: HOME | End: 2022-12-23
Attending: PHYSICIAN ASSISTANT | Admitting: PHYSICIAN ASSISTANT
Payer: COMMERCIAL

## 2022-12-23 DIAGNOSIS — K21.00 GASTROESOPHAGEAL REFLUX DISEASE WITH ESOPHAGITIS WITHOUT HEMORRHAGE: ICD-10-CM

## 2022-12-23 PROCEDURE — 255N000001 HC RX 255: Performed by: PHYSICIAN ASSISTANT

## 2022-12-23 PROCEDURE — 74240 X-RAY XM UPR GI TRC 1CNTRST: CPT

## 2022-12-23 RX ADMIN — ANTACID/ANTIFLATULENT 4 G: 380; 550; 10; 10 GRANULE, EFFERVESCENT ORAL at 11:11

## 2023-10-02 DIAGNOSIS — K21.00 GASTROESOPHAGEAL REFLUX DISEASE WITH ESOPHAGITIS WITHOUT HEMORRHAGE: ICD-10-CM

## 2023-10-02 RX ORDER — PANTOPRAZOLE SODIUM 20 MG/1
TABLET, DELAYED RELEASE ORAL
Qty: 180 TABLET | Refills: 0 | Status: SHIPPED | OUTPATIENT
Start: 2023-10-02

## 2024-01-18 DIAGNOSIS — K21.00 GASTROESOPHAGEAL REFLUX DISEASE WITH ESOPHAGITIS WITHOUT HEMORRHAGE: ICD-10-CM

## 2024-01-18 RX ORDER — PANTOPRAZOLE SODIUM 20 MG/1
TABLET, DELAYED RELEASE ORAL
Qty: 180 TABLET | Refills: 0 | OUTPATIENT
Start: 2024-01-18

## 2024-04-09 ENCOUNTER — APPOINTMENT (OUTPATIENT)
Dept: OCCUPATIONAL MEDICINE | Facility: CLINIC | Age: 29
End: 2024-04-09

## 2024-04-09 PROCEDURE — 99499 UNLISTED E&M SERVICE: CPT | Performed by: NURSE PRACTITIONER

## 2024-04-09 PROCEDURE — 99000 SPECIMEN HANDLING OFFICE-LAB: CPT | Performed by: NURSE PRACTITIONER

## 2024-10-14 ENCOUNTER — TELEPHONE (OUTPATIENT)
Dept: FAMILY MEDICINE | Facility: CLINIC | Age: 29
End: 2024-10-14

## 2024-10-14 NOTE — TELEPHONE ENCOUNTER
Leda Sousa has closed practice but will check with this provider to verify this.      JUANITA Trejo

## 2024-10-14 NOTE — TELEPHONE ENCOUNTER
General Call    Contacts       Contact Date/Time Type Contact Phone/Fax    10/14/2024 03:44 PM CDT Phone (Incoming) Ananya Gallardo (Mother) [phone] (M)          Reason for Call:  New Patient     What are your questions or concerns:  Ananya Gallardo is a patient of Dr.Michelle Sousa and want to know if she can accept Simone as a new patient, want to have him seen for anxiety and depression?    Date of last appointment with provider: Never    Okay to leave a detailed message?: Yes at Other phone number:  [phone]*

## 2024-10-21 NOTE — TELEPHONE ENCOUNTER
Attempted to call pt x3. No answer, left message to call clinic back. Closing encounter.    Lise Matthews Patient

## 2025-09-03 ENCOUNTER — TELEPHONE (OUTPATIENT)
Dept: FAMILY MEDICINE | Facility: CLINIC | Age: 30
End: 2025-09-03

## 2025-09-03 ENCOUNTER — OFFICE VISIT (OUTPATIENT)
Dept: URGENT CARE | Facility: URGENT CARE | Age: 30
End: 2025-09-03

## 2025-09-03 VITALS
WEIGHT: 156 LBS | HEART RATE: 53 BPM | BODY MASS INDEX: 27.64 KG/M2 | DIASTOLIC BLOOD PRESSURE: 86 MMHG | SYSTOLIC BLOOD PRESSURE: 130 MMHG | HEIGHT: 63 IN | TEMPERATURE: 97.9 F | RESPIRATION RATE: 15 BRPM | OXYGEN SATURATION: 100 %

## 2025-09-03 DIAGNOSIS — T54.91XA: Primary | ICD-10-CM

## 2025-09-03 PROCEDURE — 99213 OFFICE O/P EST LOW 20 MIN: CPT | Performed by: NURSE PRACTITIONER

## 2025-09-03 RX ORDER — ALBUTEROL SULFATE 90 UG/1
2 INHALANT RESPIRATORY (INHALATION) EVERY 6 HOURS PRN
Qty: 18 G | Refills: 0 | Status: SHIPPED | OUTPATIENT
Start: 2025-09-03